# Patient Record
Sex: MALE | Race: WHITE
[De-identification: names, ages, dates, MRNs, and addresses within clinical notes are randomized per-mention and may not be internally consistent; named-entity substitution may affect disease eponyms.]

---

## 2024-09-19 ENCOUNTER — APPOINTMENT (OUTPATIENT)
Dept: ORTHOPEDIC SURGERY | Facility: CLINIC | Age: 34
End: 2024-09-19
Payer: COMMERCIAL

## 2024-09-19 VITALS — WEIGHT: 193 LBS | HEIGHT: 69 IN | BODY MASS INDEX: 28.58 KG/M2

## 2024-09-19 DIAGNOSIS — M65.28 CALCIFIC TENDINITIS, OTHER SITE: ICD-10-CM

## 2024-09-19 DIAGNOSIS — M17.11 UNILATERAL PRIMARY OSTEOARTHRITIS, RIGHT KNEE: ICD-10-CM

## 2024-09-19 DIAGNOSIS — M17.0 BILATERAL PRIMARY OSTEOARTHRITIS OF KNEE: ICD-10-CM

## 2024-09-19 DIAGNOSIS — M94.269 CHONDROMALACIA, UNSPECIFIED KNEE: ICD-10-CM

## 2024-09-19 PROBLEM — Z00.00 ENCOUNTER FOR PREVENTIVE HEALTH EXAMINATION: Status: ACTIVE | Noted: 2024-09-19

## 2024-09-19 PROCEDURE — 99204 OFFICE O/P NEW MOD 45 MIN: CPT

## 2024-09-19 RX ORDER — HYALURONATE SODIUM 30 MG/2 ML
30 SYRINGE (ML) INTRAARTICULAR
Qty: 6 | Refills: 0 | Status: ACTIVE | OUTPATIENT
Start: 2024-09-19

## 2024-09-20 NOTE — ASSESSMENT
[FreeTextEntry1] : Discussed at length with patient his concerns over crepitus at the lateral knee as well as the severity of the patellofemoral chondral defects.  I reviewed that his crepitus is consistent with soft-tissue extra-articular scarring secondary to the ITB harvest and that I do not feel any scar exploration is likely to resolve this complaint.  Reviewed treatment options for the patellofemoral arthritis and in particular chondral restoration and the options, microfracture with BioC versus CRISTIAN type procedure and postop restrictions.  Patient expressed understanding and elects to try viscosupplementation at this time. Reviewed at length with patient his complaints of left posterior ankle pain and his description of a "bone spur."  I reviewed that there is no spur and that the distal Achilles has calcific deposition in the Achilles.  I reviewed treatment options which would be an open incision with reflection of the Achilles c/w a controlled tear and excision given the extent involvement.  The patient stated he only wanted a minimal procedure and stated he did not understand why the Achilles had to be mobilized and repaired.  I encouraged him to seek other opinions from other providers.

## 2024-09-20 NOTE — PHYSICAL EXAM
[de-identified] : Right knee  Constitutional:  The patient is healthy-appearing and in no apparent distress.   Gait: The patient ambulates with a normal gait and no limp.  Cardiovascular System:  The capillary refill is less than 2 seconds.   Skin:  There are no skin abnormalities. There is a healed distal IT band scar nontender to touch as well as arthroscopy scars  Right Knee:   Bony Palpation:  There is no tenderness of the medial joint line.  There is no tenderness of the lateral joint line. There is no tenderness of the medial femoral chondyle. There is no tenderness of the lateral femoral chondyle. There is no tenderness of the tibial tubercle. There is no tenderness of the superior patella. There is no tenderness of the inferior patella. There is tenderness of the medial patellar facet. There is tenderness of the lateral patellar facet.  Soft Tissue Palpation:  There is no tenderness of the medial retinaculum. There is tenderness of the lateral retinaculum. There is no tenderness of the quadriceps tendon. There is no tenderness of the patella tendon. There is no tenderness of the ITB. There is no tenderness of the pes anserine.  Active Range of Motion:  The range of motion at the knee actively and passively is full.  There is a tight lateral retinaculum. On range of motion there is palpable and audible crepitus underlying the IT band  Special Tests:  There is a negative Apley. There is a negative Steinmanns.  There is a negative Lachman and Anterior Drawer. There is a negative Posterior Drawer.   There is no varus or valgus laxity.  Strength:  There is 5/5 hip flexion and 5/5 knee flexion and extension.    Left ankle  Gait and Station:  The patient ambulates with a normal gait and no limp.   Cardiovascular System:  The capillary refill is less than 2 seconds.   Skin:  There are no skin abnormalities of ankle.  Ankles and Feet:  Inspection:  There is no erythema. There is no induration. There is no warmth. There is no deformity.   There is no swelling.   Bony Palpation:  There is no tenderness of the calcaneal tuberosity. There is no tenderness of the metatarsals. There is no tenderness of the tarsometatarsal joints There is no tenderness of the navicular tuberosity.  There is no tenderness of the dome of talus. There is no tenderness of the head of talus. There is no tenderness of the inferior tibiofibular joint.  Soft Tissue Palpation:  There is no tenderness of the tibialis posterior. There is no tenderness of the tibialis anterior.  There is no tenderness of the plantar fascia. There is tenderness of the Achilles tendon insertion. There is no tenderness of the extensor hallucis longus. There is no tenderness of the sinus tarsi.  There is no tenderness of the peroneus longus and brevis. There is no tenderness of the deltoid ligament.    There is no tenderness of the anterior talofibular ligament and the calcaneofibular ligament.   Active Range of Motion:  The range of motion at the ankle is full.   Stability:  The anterior drawer is negative.   Strength:  There is 5/5 ankle plantarflexion and dorsiflexion.  Neurological System:  There is normal sensation to light touch at the ankle and foot.   Psychiatric:  The patient demonstrates a normal mood and affect and is active and alert.   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

## 2024-09-20 NOTE — HISTORY OF PRESENT ILLNESS
[de-identified] : Patient is a new patient presenting for second opinion on multiple body parts.  Patient states on November 23, 2023 he underwent a right knee arthroscopy in [place] with ACL reconstruction via IT band procedure states overall feeling much improved but persistent soreness and in particular crepitus on the lateral aspect of his thigh deep to the IT band.  He states at that time he did and was told he had cartilage wear up underneath his kneecap and a chondroplasty was performed.  He also states 10 years of left posterior ankle soreness and was told he had a" bone spur".  He states he would like this removed

## 2024-09-20 NOTE — PHYSICAL EXAM
[de-identified] : Right knee  Constitutional:  The patient is healthy-appearing and in no apparent distress.   Gait: The patient ambulates with a normal gait and no limp.  Cardiovascular System:  The capillary refill is less than 2 seconds.   Skin:  There are no skin abnormalities. There is a healed distal IT band scar nontender to touch as well as arthroscopy scars  Right Knee:   Bony Palpation:  There is no tenderness of the medial joint line.  There is no tenderness of the lateral joint line. There is no tenderness of the medial femoral chondyle. There is no tenderness of the lateral femoral chondyle. There is no tenderness of the tibial tubercle. There is no tenderness of the superior patella. There is no tenderness of the inferior patella. There is tenderness of the medial patellar facet. There is tenderness of the lateral patellar facet.  Soft Tissue Palpation:  There is no tenderness of the medial retinaculum. There is tenderness of the lateral retinaculum. There is no tenderness of the quadriceps tendon. There is no tenderness of the patella tendon. There is no tenderness of the ITB. There is no tenderness of the pes anserine.  Active Range of Motion:  The range of motion at the knee actively and passively is full.  There is a tight lateral retinaculum. On range of motion there is palpable and audible crepitus underlying the IT band  Special Tests:  There is a negative Apley. There is a negative Steinmanns.  There is a negative Lachman and Anterior Drawer. There is a negative Posterior Drawer.   There is no varus or valgus laxity.  Strength:  There is 5/5 hip flexion and 5/5 knee flexion and extension.    Left ankle  Gait and Station:  The patient ambulates with a normal gait and no limp.   Cardiovascular System:  The capillary refill is less than 2 seconds.   Skin:  There are no skin abnormalities of ankle.  Ankles and Feet:  Inspection:  There is no erythema. There is no induration. There is no warmth. There is no deformity.   There is no swelling.   Bony Palpation:  There is no tenderness of the calcaneal tuberosity. There is no tenderness of the metatarsals. There is no tenderness of the tarsometatarsal joints There is no tenderness of the navicular tuberosity.  There is no tenderness of the dome of talus. There is no tenderness of the head of talus. There is no tenderness of the inferior tibiofibular joint.  Soft Tissue Palpation:  There is no tenderness of the tibialis posterior. There is no tenderness of the tibialis anterior.  There is no tenderness of the plantar fascia. There is tenderness of the Achilles tendon insertion. There is no tenderness of the extensor hallucis longus. There is no tenderness of the sinus tarsi.  There is no tenderness of the peroneus longus and brevis. There is no tenderness of the deltoid ligament.    There is no tenderness of the anterior talofibular ligament and the calcaneofibular ligament.   Active Range of Motion:  The range of motion at the ankle is full.   Stability:  The anterior drawer is negative.   Strength:  There is 5/5 ankle plantarflexion and dorsiflexion.  Neurological System:  There is normal sensation to light touch at the ankle and foot.   Psychiatric:  The patient demonstrates a normal mood and affect and is active and alert.   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

## 2024-09-20 NOTE — HISTORY OF PRESENT ILLNESS
[de-identified] : Patient is a new patient presenting for second opinion on multiple body parts.  Patient states on November 23, 2023 he underwent a right knee arthroscopy in [place] with ACL reconstruction via IT band procedure states overall feeling much improved but persistent soreness and in particular crepitus on the lateral aspect of his thigh deep to the IT band.  He states at that time he did and was told he had cartilage wear up underneath his kneecap and a chondroplasty was performed.  He also states 10 years of left posterior ankle soreness and was told he had a" bone spur".  He states he would like this removed

## 2024-09-26 RX ORDER — HYALURONATE SODIUM 16.8MG/2ML
16.8 SYRINGE (ML) INTRAARTICULAR
Qty: 6 | Refills: 0 | Status: ACTIVE | OUTPATIENT
Start: 2024-09-26

## 2024-10-02 ENCOUNTER — APPOINTMENT (OUTPATIENT)
Dept: ORTHOPEDIC SURGERY | Facility: CLINIC | Age: 34
End: 2024-10-02

## 2025-01-07 ENCOUNTER — NON-APPOINTMENT (OUTPATIENT)
Age: 35
End: 2025-01-07

## 2025-01-07 ENCOUNTER — APPOINTMENT (OUTPATIENT)
Dept: OTOLARYNGOLOGY | Facility: CLINIC | Age: 35
End: 2025-01-07
Payer: COMMERCIAL

## 2025-01-07 VITALS — HEIGHT: 69 IN | WEIGHT: 192 LBS | BODY MASS INDEX: 28.44 KG/M2

## 2025-01-07 DIAGNOSIS — Z87.39 PERSONAL HISTORY OF OTHER DISEASES OF THE MUSCULOSKELETAL SYSTEM AND CONNECTIVE TISSUE: ICD-10-CM

## 2025-01-07 DIAGNOSIS — Z78.9 OTHER SPECIFIED HEALTH STATUS: ICD-10-CM

## 2025-01-07 DIAGNOSIS — J34.829 NASAL VALVE COLLAPSE, UNSPECIFIED: ICD-10-CM

## 2025-01-07 DIAGNOSIS — J34.2 DEVIATED NASAL SEPTUM: ICD-10-CM

## 2025-01-07 DIAGNOSIS — K21.9 GASTRO-ESOPHAGEAL REFLUX DISEASE W/OUT ESOPHAGITIS: ICD-10-CM

## 2025-01-07 DIAGNOSIS — J34.3 HYPERTROPHY OF NASAL TURBINATES: ICD-10-CM

## 2025-01-07 PROCEDURE — 99203 OFFICE O/P NEW LOW 30 MIN: CPT | Mod: 25

## 2025-01-07 PROCEDURE — 31231 NASAL ENDOSCOPY DX: CPT

## 2025-01-23 ENCOUNTER — APPOINTMENT (OUTPATIENT)
Dept: ORTHOPEDIC SURGERY | Facility: CLINIC | Age: 35
End: 2025-01-23
Payer: COMMERCIAL

## 2025-01-23 VITALS — WEIGHT: 192 LBS | BODY MASS INDEX: 28.44 KG/M2 | HEIGHT: 69 IN

## 2025-01-23 DIAGNOSIS — Z98.890 OTHER SPECIFIED POSTPROCEDURAL STATES: ICD-10-CM

## 2025-01-23 DIAGNOSIS — M94.269 CHONDROMALACIA, UNSPECIFIED KNEE: ICD-10-CM

## 2025-01-23 DIAGNOSIS — M95.8 OTHER SPECIFIED ACQUIRED DEFORMITIES OF MUSCULOSKELETAL SYSTEM: ICD-10-CM

## 2025-01-23 PROCEDURE — 73562 X-RAY EXAM OF KNEE 3: CPT | Mod: LT,RT

## 2025-01-23 PROCEDURE — 99204 OFFICE O/P NEW MOD 45 MIN: CPT | Mod: 25

## 2025-01-23 PROCEDURE — 20610 DRAIN/INJ JOINT/BURSA W/O US: CPT | Mod: RT

## 2025-02-06 ENCOUNTER — TRANSCRIPTION ENCOUNTER (OUTPATIENT)
Age: 35
End: 2025-02-06

## 2025-02-26 ENCOUNTER — APPOINTMENT (OUTPATIENT)
Dept: ORTHOPEDIC SURGERY | Facility: CLINIC | Age: 35
End: 2025-02-26
Payer: COMMERCIAL

## 2025-02-26 DIAGNOSIS — Z98.890 OTHER SPECIFIED POSTPROCEDURAL STATES: ICD-10-CM

## 2025-02-26 DIAGNOSIS — M25.861 OTHER SPECIFIED JOINT DISORDERS, RIGHT KNEE: ICD-10-CM

## 2025-02-26 DIAGNOSIS — M79.4 HYPERTROPHY OF (INFRAPATELLAR) FAT PAD: ICD-10-CM

## 2025-02-26 PROCEDURE — 99214 OFFICE O/P EST MOD 30 MIN: CPT

## 2025-02-27 ENCOUNTER — NON-APPOINTMENT (OUTPATIENT)
Age: 35
End: 2025-02-27

## 2025-03-18 ENCOUNTER — APPOINTMENT (OUTPATIENT)
Dept: ORTHOPEDIC SURGERY | Facility: CLINIC | Age: 35
End: 2025-03-18
Payer: COMMERCIAL

## 2025-03-18 DIAGNOSIS — Z98.890 OTHER SPECIFIED POSTPROCEDURAL STATES: ICD-10-CM

## 2025-03-18 DIAGNOSIS — M25.861 OTHER SPECIFIED JOINT DISORDERS, RIGHT KNEE: ICD-10-CM

## 2025-03-18 PROCEDURE — 99213 OFFICE O/P EST LOW 20 MIN: CPT | Mod: 25

## 2025-03-18 RX ORDER — TRAMADOL HYDROCHLORIDE 50 MG/1
50 TABLET, COATED ORAL
Qty: 30 | Refills: 0 | Status: ACTIVE | COMMUNITY
Start: 2025-03-18 | End: 1900-01-01

## 2025-03-18 RX ORDER — MELOXICAM 15 MG/1
15 TABLET ORAL
Qty: 30 | Refills: 1 | Status: ACTIVE | COMMUNITY
Start: 2025-03-18 | End: 1900-01-01

## 2025-03-18 RX ORDER — OXYCODONE 5 MG/1
5 TABLET ORAL
Qty: 30 | Refills: 0 | Status: ACTIVE | COMMUNITY
Start: 2025-03-18 | End: 1900-01-01

## 2025-04-01 ENCOUNTER — APPOINTMENT (OUTPATIENT)
Dept: OTOLARYNGOLOGY | Facility: CLINIC | Age: 35
End: 2025-04-01
Payer: COMMERCIAL

## 2025-04-01 DIAGNOSIS — J31.0 CHRONIC RHINITIS: ICD-10-CM

## 2025-04-01 DIAGNOSIS — J38.3 OTHER DISEASES OF VOCAL CORDS: ICD-10-CM

## 2025-04-01 DIAGNOSIS — J34.829 NASAL VALVE COLLAPSE, UNSPECIFIED: ICD-10-CM

## 2025-04-01 DIAGNOSIS — J34.3 HYPERTROPHY OF NASAL TURBINATES: ICD-10-CM

## 2025-04-01 DIAGNOSIS — K21.9 GASTRO-ESOPHAGEAL REFLUX DISEASE W/OUT ESOPHAGITIS: ICD-10-CM

## 2025-04-01 DIAGNOSIS — J34.2 DEVIATED NASAL SEPTUM: ICD-10-CM

## 2025-04-01 PROCEDURE — 99213 OFFICE O/P EST LOW 20 MIN: CPT | Mod: 25

## 2025-04-01 PROCEDURE — 31575 DIAGNOSTIC LARYNGOSCOPY: CPT

## 2025-04-04 ENCOUNTER — APPOINTMENT (OUTPATIENT)
Age: 35
End: 2025-04-04

## 2025-04-04 PROCEDURE — 29884 ARTHRS KNEE SURG LYSIS ADS: CPT | Mod: RT

## 2025-04-10 ENCOUNTER — APPOINTMENT (OUTPATIENT)
Dept: ORTHOPEDIC SURGERY | Facility: CLINIC | Age: 35
End: 2025-04-10
Payer: COMMERCIAL

## 2025-04-10 DIAGNOSIS — M25.312 OTHER INSTABILITY, RIGHT SHOULDER: ICD-10-CM

## 2025-04-10 DIAGNOSIS — M17.0 BILATERAL PRIMARY OSTEOARTHRITIS OF KNEE: ICD-10-CM

## 2025-04-10 DIAGNOSIS — M25.311 OTHER INSTABILITY, RIGHT SHOULDER: ICD-10-CM

## 2025-04-10 PROCEDURE — 20610 DRAIN/INJ JOINT/BURSA W/O US: CPT | Mod: 58,RT

## 2025-04-10 PROCEDURE — 73030 X-RAY EXAM OF SHOULDER: CPT | Mod: 26,LT

## 2025-04-10 PROCEDURE — 99024 POSTOP FOLLOW-UP VISIT: CPT

## 2025-04-14 ENCOUNTER — NON-APPOINTMENT (OUTPATIENT)
Age: 35
End: 2025-04-14

## 2025-05-12 ENCOUNTER — APPOINTMENT (OUTPATIENT)
Dept: ORTHOPEDIC SURGERY | Facility: CLINIC | Age: 35
End: 2025-05-12
Payer: COMMERCIAL

## 2025-05-12 VITALS
OXYGEN SATURATION: 98 % | HEART RATE: 78 BPM | HEIGHT: 69 IN | WEIGHT: 192 LBS | BODY MASS INDEX: 28.44 KG/M2 | RESPIRATION RATE: 16 BRPM

## 2025-05-12 DIAGNOSIS — M75.41 IMPINGEMENT SYNDROME OF RIGHT SHOULDER: ICD-10-CM

## 2025-05-12 DIAGNOSIS — M95.8 OTHER SPECIFIED ACQUIRED DEFORMITIES OF MUSCULOSKELETAL SYSTEM: ICD-10-CM

## 2025-05-12 DIAGNOSIS — M19.112 POST-TRAUMATIC OSTEOARTHRITIS, LEFT SHOULDER: ICD-10-CM

## 2025-05-12 DIAGNOSIS — Z98.890 OTHER SPECIFIED POSTPROCEDURAL STATES: ICD-10-CM

## 2025-05-12 PROCEDURE — 99024 POSTOP FOLLOW-UP VISIT: CPT

## 2025-07-03 ENCOUNTER — NON-APPOINTMENT (OUTPATIENT)
Age: 35
End: 2025-07-03

## 2025-07-18 ENCOUNTER — APPOINTMENT (OUTPATIENT)
Age: 35
End: 2025-07-18

## 2025-08-07 ENCOUNTER — APPOINTMENT (OUTPATIENT)
Dept: ORTHOPEDIC SURGERY | Age: 35
End: 2025-08-07

## 2025-08-07 PROCEDURE — 99214 OFFICE O/P EST MOD 30 MIN: CPT | Mod: 25

## 2025-08-07 PROCEDURE — 20610 DRAIN/INJ JOINT/BURSA W/O US: CPT | Mod: RT
